# Patient Record
Sex: FEMALE | Race: WHITE | HISPANIC OR LATINO | URBAN - METROPOLITAN AREA
[De-identification: names, ages, dates, MRNs, and addresses within clinical notes are randomized per-mention and may not be internally consistent; named-entity substitution may affect disease eponyms.]

---

## 2020-10-05 ENCOUNTER — TELEPHONE ENCOUNTER (OUTPATIENT)
Dept: URBAN - METROPOLITAN AREA CLINIC 40 | Facility: CLINIC | Age: 80
End: 2020-10-05

## 2020-10-27 ENCOUNTER — WEB ENCOUNTER (OUTPATIENT)
Dept: URBAN - METROPOLITAN AREA CLINIC 13 | Facility: CLINIC | Age: 80
End: 2020-10-27

## 2020-11-03 ENCOUNTER — OFFICE VISIT (OUTPATIENT)
Dept: URBAN - METROPOLITAN AREA CLINIC 40 | Facility: CLINIC | Age: 80
End: 2020-11-03
Payer: MEDICARE

## 2020-11-03 DIAGNOSIS — R10.13 DYSPEPSIA: ICD-10-CM

## 2020-11-03 DIAGNOSIS — K92.1 RECTAL BLEEDING: ICD-10-CM

## 2020-11-03 DIAGNOSIS — C25.9 PANCREATIC CANCER: ICD-10-CM

## 2020-11-03 PROCEDURE — 99214 OFFICE O/P EST MOD 30 MIN: CPT | Performed by: INTERNAL MEDICINE

## 2020-11-03 PROCEDURE — G8417 CALC BMI ABV UP PARAM F/U: HCPCS | Performed by: INTERNAL MEDICINE

## 2020-11-03 PROCEDURE — G9902 PT SCRN TBCO AND ID AS USER: HCPCS | Performed by: INTERNAL MEDICINE

## 2020-11-03 PROCEDURE — G8427 DOCREV CUR MEDS BY ELIG CLIN: HCPCS | Performed by: INTERNAL MEDICINE

## 2020-11-03 PROCEDURE — G8482 FLU IMMUNIZE ORDER/ADMIN: HCPCS | Performed by: INTERNAL MEDICINE

## 2020-11-03 NOTE — HPI-TODAY'S VISIT:
Ms. Ross is a 80-year-old female seen in consultation request of Dr. Kika Smith for pancreatic cancer.  Patient was diagnosed with pancreatic cancer after being hospitalized with abdominal pain at the end of September and early October.  She was seen by [facility] [place].  Evaluations during that admission included an ERCP in September where she had biliary stent placement to decompress the bile duct.  EUS was also performed on October 1 by Dr. Rodriguez for tissue acquisition to confirm the pancreatic cancer.  She is following here since that group does not take her insurance.  Patient was noted to have a blood clot so she was started on anticoagulation but she ended up having both rectal bleeding and hematuria so this has been stopped.  Blood count dropped from 10-7.3 on October 28.  She received 2 units of blood since then.  No further bleeding off the blood thinners.  She has seen Dr. Mcneil in  Oncology  who was started her on chemotherapy after port placement.  She is due for her second round of chemotherapy this week.   She had a PET CT scan on October 16 that shows adequate placement of her stent.  There is mildly prominent metabolic activity associated with the stent.  There was no significant hypermetabolism within the mass in the pancreatic head.  There were no hypermetabolic foci to suggest metastatic process biliary dilatation was no longer present.  Labs do show an improvement in her LFTs.  Last set of LFTs on October 20 show normal AST and ALT when they were in the 200s and a normal bilirubin and alkaline phosphatase where they were previously over 3 and over thousand respectively.  Patient states she had a colonoscopy approximately 5 years ago where polyps were removed.  She does have reflux that is currently controlled with omeprazole.  She does have issues with dyspepsia as well as nausea.  She has pain medication through Dr. Mcneil for this.

## 2020-12-18 ENCOUNTER — DASHBOARD ENCOUNTERS (OUTPATIENT)
Age: 80
End: 2020-12-18

## 2020-12-18 ENCOUNTER — OFFICE VISIT (OUTPATIENT)
Dept: URBAN - METROPOLITAN AREA CLINIC 74 | Facility: CLINIC | Age: 80
End: 2020-12-18
Payer: MEDICARE

## 2020-12-18 ENCOUNTER — WEB ENCOUNTER (OUTPATIENT)
Dept: URBAN - METROPOLITAN AREA CLINIC 74 | Facility: CLINIC | Age: 80
End: 2020-12-18

## 2020-12-18 DIAGNOSIS — R19.7 DIARRHEA: ICD-10-CM

## 2020-12-18 DIAGNOSIS — C25.9 PANCREATIC CANCER: ICD-10-CM

## 2020-12-18 DIAGNOSIS — R10.9 ABDOMINAL CRAMPING: ICD-10-CM

## 2020-12-18 PROBLEM — 363418001 PANCREATIC CANCER: Status: ACTIVE | Noted: 2020-11-03

## 2020-12-18 PROCEDURE — G9902 PT SCRN TBCO AND ID AS USER: HCPCS | Performed by: INTERNAL MEDICINE

## 2020-12-18 PROCEDURE — G8427 DOCREV CUR MEDS BY ELIG CLIN: HCPCS | Performed by: INTERNAL MEDICINE

## 2020-12-18 PROCEDURE — G8420 CALC BMI NORM PARAMETERS: HCPCS | Performed by: INTERNAL MEDICINE

## 2020-12-18 PROCEDURE — 99214 OFFICE O/P EST MOD 30 MIN: CPT | Performed by: INTERNAL MEDICINE

## 2020-12-18 PROCEDURE — G8482 FLU IMMUNIZE ORDER/ADMIN: HCPCS | Performed by: INTERNAL MEDICINE

## 2020-12-18 RX ORDER — HYDROXYZINE HYDROCHLORIDE 25 MG/1
1 TABLET AS NEEDED TABLET, FILM COATED ORAL
Status: ACTIVE | COMMUNITY

## 2020-12-18 RX ORDER — LEVOTHYROXINE SODIUM 0.1 MG/1
1 TABLET IN THE MORNING ON AN EMPTY STOMACH TABLET ORAL ONCE A DAY
Status: ACTIVE | COMMUNITY

## 2020-12-18 RX ORDER — DICYCLOMINE HYDROCHLORIDE 10 MG/1
2 CAPSULES CAPSULE ORAL THREE TIMES A DAY
Status: DISCONTINUED | COMMUNITY

## 2020-12-18 RX ORDER — OXYCODONE HYDROCHLORIDE 5 MG/1
1 TABLET AS NEEDED TABLET ORAL
Status: ACTIVE | COMMUNITY

## 2020-12-18 RX ORDER — LORAZEPAM 0.5 MG/1
1 TABLET AT BEDTIME AS NEEDED TABLET ORAL ONCE A DAY
Status: ACTIVE | COMMUNITY

## 2020-12-18 RX ORDER — HYOSCYAMINE SULFATE 0.12 MG/1
1 TABLET UNDER THE TONGUE AND ALLOW TO DISSOLVE  AS NEEDED TABLET SUBLINGUAL THREE TIMES A DAY
OUTPATIENT
Start: 2020-12-18

## 2020-12-18 NOTE — HPI-TODAY'S VISIT:
Ms. Ross presents to clinic for follow-up.  She was last seen on November 3.  She has started on chemotherapy for her pancreatic cancer.  She has had a total of 4 treatments.  She states she has a good days and bad days.  Since starting the chemotherapy she has been having diarrhea as well as abdominal cramping.  She denies any rectal bleeding.  She does endorse nausea likely another side effect of her chemotherapy.  She is currently taking Imodium for the diarrhea.  She has had episodes of incontinence the last being yesterday.  Regarding her bloating she tried the FODMAP diet but has not noted much improvement with her bloating.  She tried dicyclomine she states that she had an unknown side effect of that.

## 2020-12-29 LAB
FECAL FAT, QUALITATIVE: (no result)
GASTROINTESTINAL PATHOGEN: (no result)
PANCREATICELASTASE ELISA, STOOL: (no result)
STOOL, WHITE BLOOD CELLS: (no result)